# Patient Record
Sex: FEMALE | Race: BLACK OR AFRICAN AMERICAN | NOT HISPANIC OR LATINO
[De-identification: names, ages, dates, MRNs, and addresses within clinical notes are randomized per-mention and may not be internally consistent; named-entity substitution may affect disease eponyms.]

---

## 2020-04-25 ENCOUNTER — MESSAGE (OUTPATIENT)
Age: 50
End: 2020-04-25

## 2020-05-13 ENCOUNTER — EMERGENCY (EMERGENCY)
Facility: HOSPITAL | Age: 50
LOS: 1 days | Discharge: HOME | End: 2020-05-13
Attending: EMERGENCY MEDICINE | Admitting: EMERGENCY MEDICINE
Payer: COMMERCIAL

## 2020-05-13 VITALS
OXYGEN SATURATION: 100 % | SYSTOLIC BLOOD PRESSURE: 132 MMHG | RESPIRATION RATE: 18 BRPM | TEMPERATURE: 98 F | DIASTOLIC BLOOD PRESSURE: 85 MMHG | HEART RATE: 82 BPM

## 2020-05-13 VITALS
DIASTOLIC BLOOD PRESSURE: 99 MMHG | OXYGEN SATURATION: 100 % | RESPIRATION RATE: 18 BRPM | SYSTOLIC BLOOD PRESSURE: 146 MMHG | TEMPERATURE: 99 F | HEART RATE: 88 BPM

## 2020-05-13 DIAGNOSIS — Z98.891 HISTORY OF UTERINE SCAR FROM PREVIOUS SURGERY: Chronic | ICD-10-CM

## 2020-05-13 LAB
ALBUMIN SERPL ELPH-MCNC: 3.9 G/DL — SIGNIFICANT CHANGE UP (ref 3.3–5)
ALP SERPL-CCNC: 76 U/L — SIGNIFICANT CHANGE UP (ref 40–120)
ALT FLD-CCNC: 15 U/L — SIGNIFICANT CHANGE UP (ref 4–33)
ANION GAP SERPL CALC-SCNC: 12 MMO/L — SIGNIFICANT CHANGE UP (ref 7–14)
AST SERPL-CCNC: 21 U/L — SIGNIFICANT CHANGE UP (ref 4–32)
BASOPHILS # BLD AUTO: 0.01 K/UL — SIGNIFICANT CHANGE UP (ref 0–0.2)
BASOPHILS NFR BLD AUTO: 0.1 % — SIGNIFICANT CHANGE UP (ref 0–2)
BILIRUB SERPL-MCNC: 0.2 MG/DL — SIGNIFICANT CHANGE UP (ref 0.2–1.2)
BUN SERPL-MCNC: 9 MG/DL — SIGNIFICANT CHANGE UP (ref 7–23)
CALCIUM SERPL-MCNC: 9.7 MG/DL — SIGNIFICANT CHANGE UP (ref 8.4–10.5)
CHLORIDE SERPL-SCNC: 103 MMOL/L — SIGNIFICANT CHANGE UP (ref 98–107)
CO2 SERPL-SCNC: 25 MMOL/L — SIGNIFICANT CHANGE UP (ref 22–31)
CREAT SERPL-MCNC: 0.83 MG/DL — SIGNIFICANT CHANGE UP (ref 0.5–1.3)
EOSINOPHIL # BLD AUTO: 0.16 K/UL — SIGNIFICANT CHANGE UP (ref 0–0.5)
EOSINOPHIL NFR BLD AUTO: 2.2 % — SIGNIFICANT CHANGE UP (ref 0–6)
GLUCOSE SERPL-MCNC: 116 MG/DL — HIGH (ref 70–99)
HCT VFR BLD CALC: 38.4 % — SIGNIFICANT CHANGE UP (ref 34.5–45)
HGB BLD-MCNC: 12.1 G/DL — SIGNIFICANT CHANGE UP (ref 11.5–15.5)
IMM GRANULOCYTES NFR BLD AUTO: 0.1 % — SIGNIFICANT CHANGE UP (ref 0–1.5)
LYMPHOCYTES # BLD AUTO: 1.19 K/UL — SIGNIFICANT CHANGE UP (ref 1–3.3)
LYMPHOCYTES # BLD AUTO: 16.6 % — SIGNIFICANT CHANGE UP (ref 13–44)
MAGNESIUM SERPL-MCNC: 1.7 MG/DL — SIGNIFICANT CHANGE UP (ref 1.6–2.6)
MCHC RBC-ENTMCNC: 26.8 PG — LOW (ref 27–34)
MCHC RBC-ENTMCNC: 31.5 % — LOW (ref 32–36)
MCV RBC AUTO: 85 FL — SIGNIFICANT CHANGE UP (ref 80–100)
MONOCYTES # BLD AUTO: 0.68 K/UL — SIGNIFICANT CHANGE UP (ref 0–0.9)
MONOCYTES NFR BLD AUTO: 9.5 % — SIGNIFICANT CHANGE UP (ref 2–14)
NEUTROPHILS # BLD AUTO: 5.1 K/UL — SIGNIFICANT CHANGE UP (ref 1.8–7.4)
NEUTROPHILS NFR BLD AUTO: 71.5 % — SIGNIFICANT CHANGE UP (ref 43–77)
NRBC # FLD: 0 K/UL — SIGNIFICANT CHANGE UP (ref 0–0)
PHOSPHATE SERPL-MCNC: 2.7 MG/DL — SIGNIFICANT CHANGE UP (ref 2.5–4.5)
PLATELET # BLD AUTO: 356 K/UL — SIGNIFICANT CHANGE UP (ref 150–400)
PMV BLD: 9.3 FL — SIGNIFICANT CHANGE UP (ref 7–13)
POTASSIUM SERPL-MCNC: 3.5 MMOL/L — SIGNIFICANT CHANGE UP (ref 3.5–5.3)
POTASSIUM SERPL-SCNC: 3.5 MMOL/L — SIGNIFICANT CHANGE UP (ref 3.5–5.3)
PROT SERPL-MCNC: 7.9 G/DL — SIGNIFICANT CHANGE UP (ref 6–8.3)
RBC # BLD: 4.52 M/UL — SIGNIFICANT CHANGE UP (ref 3.8–5.2)
RBC # FLD: 13.6 % — SIGNIFICANT CHANGE UP (ref 10.3–14.5)
SODIUM SERPL-SCNC: 140 MMOL/L — SIGNIFICANT CHANGE UP (ref 135–145)
TSH SERPL-MCNC: 1 UIU/ML — SIGNIFICANT CHANGE UP (ref 0.27–4.2)
WBC # BLD: 7.15 K/UL — SIGNIFICANT CHANGE UP (ref 3.8–10.5)
WBC # FLD AUTO: 7.15 K/UL — SIGNIFICANT CHANGE UP (ref 3.8–10.5)

## 2020-05-13 PROCEDURE — 99283 EMERGENCY DEPT VISIT LOW MDM: CPT | Mod: 25

## 2020-05-13 PROCEDURE — 93010 ELECTROCARDIOGRAM REPORT: CPT

## 2020-05-13 RX ORDER — ACETAMINOPHEN 500 MG
650 TABLET ORAL ONCE
Refills: 0 | Status: COMPLETED | OUTPATIENT
Start: 2020-05-13 | End: 2020-05-13

## 2020-05-13 RX ADMIN — Medication 650 MILLIGRAM(S): at 04:18

## 2020-05-13 NOTE — ED ADULT NURSE NOTE - OBJECTIVE STATEMENT
Pt c/o palpitations with some sob onset months ago and headache today.  Pt reports that she has seen her pmd for the palpitations but no dx.  Pt also reports that palpitations come on when she is "up and working".  Pt states sitting down makes it better and that she has no sx when lying down.  Pt denies cp, n/v.

## 2020-05-13 NOTE — ED PROVIDER NOTE - CLINICAL SUMMARY MEDICAL DECISION MAKING FREE TEXT BOX
50F no pmhx, well appearing, c/o palpitations, not presently occurring, intermittent, already seen by pmd and started on propanolol, does not have cardiologist, suspect possible paroxysmal svt or afib or other arrhythmia, no fam hx of sudden cardiac death, ekg sinus with 1st degree avb, will check electrolytes, TSH and reassess, likely dc with cardiology referral.

## 2020-05-13 NOTE — ED PROVIDER NOTE - ATTENDING CONTRIBUTION TO CARE
51 yo o/w healthy presenting with 2 months of intermittent palpitations.  Had an episode of these palps walking to her car after work today.  Has since resolved.  No CP or SOB.  There was some lightheadedness which is typical of these episodes.  PMD started her on propranolol but has not stopped symptoms    Gen: Well appearing in NAD  Head: NC/AT  Neck: trachea midline  Resp:  No distress  CV: RRR  Ext: no deformities  Neuro:  A&O appears non focal  Skin:  Warm and dry as visualized  Psych:  Normal affect and mood     51 yo with multiple episodes of palps with an episode tonight that has already resolved.  Based on PMD meds could be related to parox a-fib.  With normal EKG here and no other symptoms will check lytes, HgB and thyroid studies.  If remains asymptomatic will be able to have follow up with PMD

## 2020-05-13 NOTE — ED PROVIDER NOTE - NSFOLLOWUPINSTRUCTIONS_ED_ALL_ED_FT
You were seen today for palpitations, your electrolytes and thyroid function were within normal. We recommend you follow up with your primary care doctor or a cardiologist. Stay hydrated. Avoid caffeine or foods with caffeine such as chocolates.     Palpitations    A palpitation is the feeling that your heartbeat is irregular or is faster than normal. It may feel like your heart is fluttering or skipping a beat. They may be caused by many things, including smoking, caffeine, alcohol, stress, and certain medicines. Although most causes of palpitations are not serious, palpitations can be a sign of a serious medical problem. Avoid caffeine, alcohol, and tobacco products at home. Try to reduce stress and anxiety and make sure to get plenty of rest.     SEEK IMMEDIATE MEDICAL CARE IF YOU HAVE ANY OF THE FOLLOWING SYMPTOMS: chest pain, shortness of breath, severe headache, dizziness/lightheadedness, or fainting.

## 2020-05-13 NOTE — ED PROVIDER NOTE - PROGRESS NOTE DETAILS
Tony PGY3: pt reassessed, Jimenez PGY3: pt reassessed, asymptomatic, no arrhythmias seen on monitor, HR wnl, normotensive, mild headache improved. pt instructed to f/u w/ outpt cards and given return precautions.

## 2020-05-13 NOTE — ED PROVIDER NOTE - NS ED ROS FT
Gen: No fever, normal appetite  Eyes: No eye irritation or discharge  ENT: No ear pain, congestion, sore throat  Resp: No cough or trouble breathing  Cardiovascular: no chest pain, + palpitations with light headedness   Gastroenteric: No nausea/vomiting, diarrhea, constipation  :  No change in urine output; no dysuria  MS: No joint or muscle pain  Skin: No rashes  Neuro: No headache; no abnormal movements  Remainder negative, except as per the HPI

## 2020-05-13 NOTE — ED ADULT NURSE NOTE - CHPI ED NUR SYMPTOMS NEG
no chest pain/no chills/no diaphoresis/no dizziness/no fever/no back pain/no congestion/no vomiting/no nausea/no syncope

## 2020-05-13 NOTE — ED PROVIDER NOTE - PHYSICAL EXAMINATION
Gen: AAOx3, NAD  Head: NCAT  ENT: Airway patent, moist mucous membranes, nasal passageways clear   Cardiac: Normal rate, normal rhythm, no murmurs/rubs/gallops appreciated  Respiratory: Lungs CTA B/L  Gastrointestinal:   Abdomen soft, nontender, nondistended, no rebound, no guarding   MSK: No gross abnormalities, FROM of all four extremities, no edema  HEME: Extremities warm, pulses intact and symmetrical in all four extremities  Skin: No rashes, no lesions  Neuro: No gross neurologic deficits, no gait abnormality

## 2020-05-13 NOTE — ED ADULT TRIAGE NOTE - CHIEF COMPLAINT QUOTE
Pt. c/o intermittent generalized headache and palpitations that began about 2 months ago, tonight's episode began around 1:20 am. Denies PMHx.

## 2020-05-13 NOTE — ED PROVIDER NOTE - OBJECTIVE STATEMENT
50F no pmhx p/w palpitations, intermittent, ongoing  x 2 months, but presented to the ED due to persistence of symptoms despite starting propranolol as directed by her pmd. No associated chest pain or sob. Does endorse light headed sensation that comes with palpitations. Notes last week palpitations lasted 3 days. Now does not have palpitations. No fam hx of CAD or sudden cardiac death, no hx of alcohol use or heavy caffeine use, no wt loss/ GI bowel changes. Palpitations are not exertional and there are no provoking factors.

## 2020-05-13 NOTE — ED PROVIDER NOTE - PATIENT PORTAL LINK FT
You can access the FollowMyHealth Patient Portal offered by [facility] by registering at the following website: [email or web address]. By joining [employer]’s FollowMyHealth portal, you will also be able to view your health information using other applications (apps) compatible with our system.

## 2020-12-23 ENCOUNTER — APPOINTMENT (OUTPATIENT)
Dept: ULTRASOUND IMAGING | Facility: IMAGING CENTER | Age: 50
End: 2020-12-23
Payer: COMMERCIAL

## 2020-12-23 ENCOUNTER — OUTPATIENT (OUTPATIENT)
Dept: OUTPATIENT SERVICES | Facility: HOSPITAL | Age: 50
LOS: 1 days | End: 2020-12-23
Payer: COMMERCIAL

## 2020-12-23 ENCOUNTER — APPOINTMENT (OUTPATIENT)
Dept: MAMMOGRAPHY | Facility: IMAGING CENTER | Age: 50
End: 2020-12-23
Payer: COMMERCIAL

## 2020-12-23 DIAGNOSIS — Z00.8 ENCOUNTER FOR OTHER GENERAL EXAMINATION: ICD-10-CM

## 2020-12-23 DIAGNOSIS — Z98.891 HISTORY OF UTERINE SCAR FROM PREVIOUS SURGERY: Chronic | ICD-10-CM

## 2020-12-23 PROCEDURE — 76641 ULTRASOUND BREAST COMPLETE: CPT | Mod: 26,50

## 2020-12-23 PROCEDURE — 77063 BREAST TOMOSYNTHESIS BI: CPT | Mod: 26

## 2020-12-23 PROCEDURE — 76641 ULTRASOUND BREAST COMPLETE: CPT

## 2020-12-23 PROCEDURE — 77067 SCR MAMMO BI INCL CAD: CPT | Mod: 26

## 2020-12-23 PROCEDURE — 77063 BREAST TOMOSYNTHESIS BI: CPT

## 2020-12-23 PROCEDURE — 77067 SCR MAMMO BI INCL CAD: CPT

## 2021-06-15 ENCOUNTER — OUTPATIENT (OUTPATIENT)
Dept: OUTPATIENT SERVICES | Facility: HOSPITAL | Age: 51
LOS: 1 days | End: 2021-06-15
Payer: COMMERCIAL

## 2021-06-15 ENCOUNTER — APPOINTMENT (OUTPATIENT)
Dept: ULTRASOUND IMAGING | Facility: IMAGING CENTER | Age: 51
End: 2021-06-15
Payer: COMMERCIAL

## 2021-06-15 DIAGNOSIS — Z00.8 ENCOUNTER FOR OTHER GENERAL EXAMINATION: ICD-10-CM

## 2021-06-15 DIAGNOSIS — Z98.891 HISTORY OF UTERINE SCAR FROM PREVIOUS SURGERY: Chronic | ICD-10-CM

## 2021-06-15 PROCEDURE — 76642 ULTRASOUND BREAST LIMITED: CPT

## 2021-06-15 PROCEDURE — 76642 ULTRASOUND BREAST LIMITED: CPT | Mod: 26,LT

## 2023-03-30 NOTE — ED ADULT NURSE NOTE - PAIN: BODY LOCATION
PRE-OP INSTRUCTIONS FOR SURGICAL PATIENTS          Our Pre-admission Testing Nurses tried and were unable to reach you today. Please read the attached instructions if you did not listen to your voicemail. Follow all instructions provided to you from your surgeon's office, including your ARRIVAL TIME. Arrange for someone to drive you home and be with you for the first 24 hours after discharge. NOTE: at this time ONLY 2 ADULTS may accompany you   One person encouraged to stay at hospital entire time if outpatient surgery    Enter the MAIN entrance located on [address] and report to the surgical desk on the LEFT side of the lobby. Please park in the parking garage or there is free [place] available after 7am for your use. Bring your insurance card & photo ID with you to register. Bring your medication list with you with dose and frequency listed (including over the counter medications)  Contact your ordering physician/surgeon for medication instructions as soon as possible, especially if taking blood thinners, aspirin, heart, or diabetic medication. Bariatric surgical patients need to call your surgeon if on diabetic medications (as some may need to be stopped 1-week preop)  A Pre-Surgical History and Physical MUST be completed WITHIN 30 DAYS OR LESS prior to your procedure by your Physician or an Urgent Care. DO NOT EAT ANYTHING 8 hours prior to arrival for surgery. You may have sips of WATER ONLY (up to 8 ounces) 4 hours prior to your arrival for surgery. Then nothing further 4 hours prior to arriving at hospital.   NOTE: ALL Gastric, Bariatric & Bowel surgery patients - you MUST follow your surgeon's instructions regarding eating/drinking as you will have very specific instructions to follow. If you did not receive these, call your surgeon's office immediately. No gum, candy, mints, or ice chips day of procedure.    Please refrain from drinking alcohol the day before or day of your generalized/head/headache

## 2024-03-07 ENCOUNTER — EMERGENCY (EMERGENCY)
Facility: HOSPITAL | Age: 54
LOS: 1 days | Discharge: HOME | End: 2024-03-07
Attending: EMERGENCY MEDICINE | Admitting: EMERGENCY MEDICINE
Payer: OTHER MISCELLANEOUS

## 2024-03-07 VITALS
TEMPERATURE: 98 F | RESPIRATION RATE: 18 BRPM | HEART RATE: 96 BPM | DIASTOLIC BLOOD PRESSURE: 89 MMHG | OXYGEN SATURATION: 100 % | SYSTOLIC BLOOD PRESSURE: 157 MMHG

## 2024-03-07 DIAGNOSIS — Z98.891 HISTORY OF UTERINE SCAR FROM PREVIOUS SURGERY: Chronic | ICD-10-CM

## 2024-03-07 PROCEDURE — 99053 MED SERV 10PM-8AM 24 HR FAC: CPT

## 2024-03-07 PROCEDURE — 99283 EMERGENCY DEPT VISIT LOW MDM: CPT

## 2024-03-07 NOTE — ED PROVIDER NOTE - CLINICAL SUMMARY MEDICAL DECISION MAKING FREE TEXT BOX
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 Communicable infections such as Hepatitis A/B/C, HIV, and Tetanus must be considered and evaluated. However, there is low risk for these infections given either patient's medical history.    Plan:  #Needle stick injury  - Occupational exposure labs  - Follow up with [employer]

## 2024-03-07 NOTE — ED ADULT NURSE NOTE - OBJECTIVE STATEMENT
INTAKE RN. Patient A&Ox4 ambulatory c/o needle stick injury this morning. 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 She reports that the patient she was working with does not have a history of Hepatitis or HIV, but they consented to have their blood tested today as well. On assessment patient well appearing, respirations even and unlabored. Patient offers no complaints at this time. Patient butterflied for lab work, collected and sent to lab. Stretcher in lowest position, wheels locked, appropriate side rails in place, call bell in reach.

## 2024-03-07 NOTE — ED ADULT TRIAGE NOTE - CHIEF COMPLAINT QUOTE
staff from [facility] needle stick left index finger, no active bleeding noted. In no apparent distress. Denies pmhx

## 2024-03-07 NOTE — ED PROVIDER NOTE - PATIENT PORTAL LINK FT
You can access the FollowMyHealth Patient Portal offered by [facility] by registering at the following website: [email or web address]. By joining [email or web address]’s FollowMyHealth portal, you will also be able to view your health information using other applications (apps) compatible with our system.

## 2024-03-07 NOTE — ED PROVIDER NOTE - ATTENDING CONTRIBUTION TO CARE
I performed a face to face evaluation of this patient and performed a full history and physical examination on the patient.  I agree with the student's history, physical examination, and plan of the patient.  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 Communicable infections such as Hepatitis A/B/C, HIV, and Tetanus must be considered and evaluated. However, there is low risk for these infections given either patient's medical history.  Pt up to date on tetanus, Hep B vaccines    Plan:  #Needle stick injury  - Occupational exposure labs  - Follow up with [employer]    Right finger with tiny puncture wound less than 1 mm without bleeding, redness or finger deformity  Pt received counseling and the need for followup  Shared decision making and will hold on post exposure prophylaxis for HIV.

## 2024-03-07 NOTE — ED ADULT NURSE NOTE - NSFALLUNIVINTERV_ED_ALL_ED
Bed/Stretcher in lowest position, wheels locked, appropriate side rails in place/Call bell, personal items and telephone in reach/Instruct patient to call for assistance before getting out of bed/chair/stretcher/Non-slip footwear applied when patient is off stretcher/[place] to call system/Physically safe environment - no spills, clutter or unnecessary equipment/Purposeful proactive rounding/Room/bathroom lighting operational, light cord in reach

## 2024-03-07 NOTE — ED PROVIDER NOTE - NSDCPRINTRESULTS_ED_ALL_ED
Patient requests all Lab, Cardiology, and Radiology Results on their Discharge Instructions
normal saline

## 2024-03-07 NOTE — ED PROVIDER NOTE - OBJECTIVE STATEMENT
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 She reports that the patient she was working with does not have a history of Hepatitis or HIV, but they consented to have their blood tested today as well.     PMHx: None significant  Med: None  PSHx:   All: None  Soc: No alcohol, tobacco, or recreational drug use

## 2024-03-07 NOTE — ED PROVIDER NOTE - PHYSICAL EXAMINATION
Constitutional - NAD, Comfortable  HEENT - NCAT, EOMI  Neck - No limited ROM  Chest -  Breathing comfortably on room air   Cardio - warm and well perfused  Extremities - (+) Pinpoint puncture wound on Right index finger, without swelling or surrounding erythema  Neurologic - Fully awake and alert  Psychiatric - Mood stable, Affect WNL

## 2024-03-07 NOTE — ED PROVIDER NOTE - NSFOLLOWUPINSTRUCTIONS_ED_ALL_ED_FT
You were seen in the [facility] emergency department for a needle stick injury on your Right index finger. You were seen and evaluated by a physician, who discussed the potential testing and medications that are considered after a needle stick injury. You received blood testing for infections that can potentially be transmitted from one person to another through contamination of blood. These infections include Hepatitis A/B/C and HIV. You also received information about the uses, benefits, and risks of medications to lower the risk of developing these infections.     After leaving the emergency department, please continue to keep the wound on your Right index finger clean by washing it with soap and water.     Please return to the emergency department immediately if you experience continued bleeding, swelling, redness, or drainage of non-bloody fluid from the wound, or if you start experiencing other signs of infection such as fever, chills, or body aches.    Please follow up with [employer] in the next 3 days to follow up about your test results and next steps.

## 2024-12-27 ENCOUNTER — EMERGENCY (EMERGENCY)
Facility: HOSPITAL | Age: 54
LOS: 0 days | Discharge: HOME | End: 2024-12-27
Attending: STUDENT IN AN ORGANIZED HEALTH CARE EDUCATION/TRAINING PROGRAM

## 2024-12-27 VITALS
TEMPERATURE: 100 F | OXYGEN SATURATION: 98 % | DIASTOLIC BLOOD PRESSURE: 89 MMHG | RESPIRATION RATE: 18 BRPM | SYSTOLIC BLOOD PRESSURE: 128 MMHG | HEART RATE: 93 BPM

## 2024-12-27 VITALS
SYSTOLIC BLOOD PRESSURE: 144 MMHG | DIASTOLIC BLOOD PRESSURE: 87 MMHG | RESPIRATION RATE: 17 BRPM | OXYGEN SATURATION: 98 % | HEART RATE: 97 BPM | WEIGHT: 188.05 LBS | TEMPERATURE: 99 F | HEIGHT: 65 IN

## 2024-12-27 DIAGNOSIS — M54.42 LUMBAGO WITH SCIATICA, LEFT SIDE: ICD-10-CM

## 2024-12-27 DIAGNOSIS — M54.50 LOW BACK PAIN, UNSPECIFIED: ICD-10-CM

## 2024-12-27 DIAGNOSIS — Z86.69 PERSONAL HISTORY OF OTHER DISEASES OF THE NERVOUS SYSTEM AND SENSE ORGANS: ICD-10-CM

## 2024-12-27 DIAGNOSIS — Z98.891 HISTORY OF UTERINE SCAR FROM PREVIOUS SURGERY: Chronic | ICD-10-CM

## 2024-12-27 DIAGNOSIS — Z87.19 PERSONAL HISTORY OF OTHER DISEASES OF THE DIGESTIVE SYSTEM: ICD-10-CM

## 2024-12-27 DIAGNOSIS — M19.90 UNSPECIFIED OSTEOARTHRITIS, UNSPECIFIED SITE: ICD-10-CM

## 2024-12-27 DIAGNOSIS — M54.41 LUMBAGO WITH SCIATICA, RIGHT SIDE: ICD-10-CM

## 2024-12-27 DIAGNOSIS — K21.9 GASTRO-ESOPHAGEAL REFLUX DISEASE WITHOUT ESOPHAGITIS: ICD-10-CM

## 2024-12-27 PROCEDURE — 99284 EMERGENCY DEPT VISIT MOD MDM: CPT

## 2024-12-27 RX ORDER — KETOROLAC TROMETHAMINE 30 MG/ML
30 INJECTION INTRAMUSCULAR; INTRAVENOUS ONCE
Refills: 0 | Status: DISCONTINUED | OUTPATIENT
Start: 2024-12-27 | End: 2024-12-27

## 2024-12-27 RX ORDER — DIAZEPAM 10 MG/1
5 TABLET ORAL ONCE
Refills: 0 | Status: DISCONTINUED | OUTPATIENT
Start: 2024-12-27 | End: 2024-12-27

## 2024-12-27 RX ORDER — METHOCARBAMOL 500 MG/1
2 TABLET, FILM COATED ORAL
Qty: 18 | Refills: 0
Start: 2024-12-27 | End: 2024-12-29

## 2024-12-27 RX ORDER — IBUPROFEN 200 MG
1 TABLET ORAL
Qty: 9 | Refills: 0
Start: 2024-12-27 | End: 2024-12-29

## 2024-12-27 RX ORDER — LIDOCAINE 40 MG/G
1 CREAM TOPICAL ONCE
Refills: 0 | Status: COMPLETED | OUTPATIENT
Start: 2024-12-27 | End: 2024-12-27

## 2024-12-27 RX ORDER — ACETAMINOPHEN 500MG 500 MG/1
650 TABLET, COATED ORAL ONCE
Refills: 0 | Status: COMPLETED | OUTPATIENT
Start: 2024-12-27 | End: 2024-12-27

## 2024-12-27 RX ADMIN — KETOROLAC TROMETHAMINE 30 MILLIGRAM(S): 30 INJECTION INTRAMUSCULAR; INTRAVENOUS at 11:03

## 2024-12-27 RX ADMIN — LIDOCAINE 1 PATCH: 40 CREAM TOPICAL at 10:04

## 2024-12-27 RX ADMIN — ACETAMINOPHEN 500MG 650 MILLIGRAM(S): 500 TABLET, COATED ORAL at 11:03

## 2024-12-27 RX ADMIN — KETOROLAC TROMETHAMINE 30 MILLIGRAM(S): 30 INJECTION INTRAMUSCULAR; INTRAVENOUS at 10:05

## 2024-12-27 RX ADMIN — ACETAMINOPHEN 500MG 650 MILLIGRAM(S): 500 TABLET, COATED ORAL at 10:03

## 2024-12-27 RX ADMIN — DIAZEPAM 5 MILLIGRAM(S): 10 TABLET ORAL at 10:03

## 2024-12-27 NOTE — ED ADULT NURSE NOTE - OBJECTIVE STATEMENT
55yo female, A&Ox4, presents to ED c/o left posterior leg pain since12/25. Patient denies fall, injury or trauma, No injuries noted on inspection. Denies numbness or tingling. No hx of symptoms in the past.

## 2024-12-27 NOTE — ED PROVIDER NOTE - PROGRESS NOTE DETAILS
Nigel Larios, EM NP Fellow: Patient with improvement in pain and ROM, able to lift left leg off bed and resist against gravity and pressure. patient endorses she does not feel well enough to leave yet. will reveal. MD Diallo aware of and in agreement with plan of care. Nigel Larios, EM NP Fellow: Patient with improvement in pain, able to stand up without assistance and ambulate. Patient endorses she feels ready to go home. Patient to call cab for ride home. Patient provided strict return precautions, educated on rx for muscle relaxer and nsaid. Patient provided time to ask questions which were answered at the bedside by me. Md Diallo aware of and in agreement with plan of care.

## 2024-12-27 NOTE — ED PROVIDER NOTE - NSFOLLOWUPINSTRUCTIONS_ED_ALL_ED_FT
- You were seen in the ER today for back pain.    - A prescription was sent to your pharmacy for Robaxin, a muscle relaxer, you can take this every 8 hours as needed for pain or muscle spams. DO NOT drive after taking this medication as it can cause drowsiness, DO NOT drink alcohol while taking this medication as it can exacerbate the effects such as drowsiness.     - A prescription was also sent for Naproxen, an NSAID, you can take this two times daily for pain.     - You can use over the counter Lidocaine patches for pain, these can stay ON for 12 hours, then must come OFF for 12 hours. You can apply one every 24 hours as needed for pain.     - Please follow up with the spine specialist, A referral coordinator will call you in the next 2-3 business days to assist you in obtaining a follow up appointment.   [facility]  [phone]   [phone]  [email or web address]      - Patient return to the ER for severe or worsening pain, bowel or bladder control problems, unusual weakness or numbness in your arms or legs, inability to ambulate, nausea or vomiting, abdominal pain, fever, dizziness/lightheadedness, or any other concerns.       Back Pain    Back pain is very common in adults. The cause of back pain is rarely dangerous and the pain often gets better over time. The cause of your back pain may not be known and may include strain of muscles or ligaments, degeneration of the spinal disks, or arthritis. Occasionally the pain may radiate down your leg(s). Over-the-counter medicines to reduce pain and inflammation are often the most helpful. Stretching and remaining active frequently helps the healing process. - You were seen in the ER today for back pain.    - A prescription was sent to your pharmacy for Robaxin, a muscle relaxer, you can take this every 8 hours as needed for pain or muscle spams. DO NOT drive after taking this medication as it can cause drowsiness, DO NOT drink alcohol while taking this medication as it can exacerbate the effects such as drowsiness.     - A prescription was also sent for Ibuprofen, an NSAID, you can take this two times daily for pain, take with food.     - You can use over the counter Lidocaine patches for pain, these can stay ON for 12 hours, then must come OFF for 12 hours. You can apply one every 24 hours as needed for pain.     - Please follow up with the spine specialist.  [facility]  [phone]   [phone]  [email or web address]      - Patient return to the ER for severe or worsening pain, bowel or bladder control problems, unusual weakness or numbness in your arms or legs, inability to ambulate, nausea or vomiting, abdominal pain, fever, dizziness/lightheadedness, or any other concerns.       Back Pain    Back pain is very common in adults. The cause of back pain is rarely dangerous and the pain often gets better over time. The cause of your back pain may not be known and may include strain of muscles or ligaments, degeneration of the spinal disks, or arthritis. Occasionally the pain may radiate down your leg(s). Over-the-counter medicines to reduce pain and inflammation are often the most helpful. Stretching and remaining active frequently helps the healing process.

## 2024-12-27 NOTE — ED PROVIDER NOTE - PHYSICAL EXAMINATION
CONSTITUTIONAL: A&O x3, NAD, resting comfortably, well groomed  HEAD: Normocephalic, atraumatic.   NECK:  Airway patent. Neck Supple. FROM without pain.   RESPIRATORY: breathing non-labored.    ABDOMEN: soft, non-distended; non-TTP. No CVA tenderness.  PERIPHERAL VASCULAR: No edema of feet and ankles. No varicosities. No discoloration, pigmentation changes, ulcers or lesions. No calf tenderness. Pulses 2+ B/L.   MSK: Moving all extremities. No deformities or swelling. Normal color and temperature. No bony or soft tissue tenderness. Full ROM and Strength 5+/5+ B/L upper and right extremities. Negative straight leg test on right side.  Patient refusing to lift left leg 2/2 pain at this time. + B/L lumbar paraspinal tenderness. No cervical, thoracic, lumbar, sacral spinal tenderness. Soft compartments B/L lower extremities.   SKIN: Warm, dry, color WNL, no turgor, erythema or rashes. Cap refill < 2 sec.  NEURO: A&Ox3, interactive, cooperative, no focal deficits. No paresthesias.

## 2024-12-27 NOTE — ED PROVIDER NOTE - CARE PROVIDER_API CALL
Tj Flores  Orthopaedic Surgery  [address] [facility], [address] [address]  Phone: [phone]  Fax: [phone]  Follow Up Time: Routine

## 2024-12-27 NOTE — ED PROVIDER NOTE - PATIENT PORTAL LINK FT
You can access the FollowMyHealth Patient Portal offered by [facility] by registering at the following website: [email or web address]. By joining [employer] [facility]’s FollowMyHealth portal, you will also be able to view your health information using other applications (apps) compatible with our system.

## 2024-12-27 NOTE — ED ADULT TRIAGE NOTE - HEIGHT IN INCHES
Patient states compliant with regimen. Bleeding or thromboembolic side effects:  none. Significant med or dietary changes:  none. Significant recent illness or disease state changes:  none. PT/INR done in office per protocol in room #2. INR today is 2.6, within therapeutic range. No changes in therapy were made today--will continue current regimen and RTC in 4 weeks. Pt did not need a flu shot, stating he already got one for the year. Patient understands dosing directions and information discussed. Dosing schedule given to patient. Progress note sent to referring office. 5

## 2024-12-27 NOTE — ED PROVIDER NOTE - NS ED ATTENDING STATEMENT MOD
Attending with As certified below, I, or a nurse practitioner or physician assistant working with me, had a face-to-face encounter that meets the physician face-to-face encounter requirements.

## 2024-12-27 NOTE — ED PROVIDER NOTE - CLINICAL SUMMARY MEDICAL DECISION MAKING FREE TEXT BOX
GIRISH Sargent NP Fellow: Patient is 53yo F PMHx GERD, arthritis, presents with c/o low back pain radiating down left leg x3 days. Patient endorses she has been taking Tylenol and Flexeril, last dose 11pm last night, without relief of pain. 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 Patient denies trauma, heavy lifting, MVCs, fevers, chills, numbness, tingling or legs or groin, weakness of legs, incontinence or retention of bowel or bladder.   Patient well appearing, nontoxic, and in no apparent distress. Physical exam as above, pertinent for + lumbar paraspinal tenderness bilaterally and pain radiating down left leg.  ddx concerning for but not limited to msk pain vs sciatica. Less concern for spinal cord impingement in the absence of reg flag symptoms.  will order Tylenol, Toradol, valium, and lidocaine patch and reassess.  disposition pending reassessment, will likely be discharged home with spine specialist follow up. GIRISH Sargent NP Fellow: Patient is 55yo F PMHx GERD, arthritis, presents with c/o low back pain radiating down left leg x3 days. Patient endorses she has been taking Tylenol and Flexeril, last dose 11pm last night, without relief of pain. 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 Patient denies trauma, heavy lifting, MVCs, fevers, chills, numbness, tingling or legs or groin, weakness of legs, incontinence or retention of bowel or bladder.  Patient endorses she took cab to ER today.   Patient well appearing, nontoxic, and in no apparent distress. Physical exam as above, pertinent for + lumbar paraspinal tenderness bilaterally and pain radiating down left leg.  ddx concerning for but not limited to msk pain vs sciatica. Less concern for spinal cord impingement in the absence of reg flag symptoms.  will order Tylenol, Toradol, valium, and lidocaine patch and reassess.  disposition pending reassessment, will likely be discharged home with spine specialist follow up.